# Patient Record
Sex: FEMALE | Race: BLACK OR AFRICAN AMERICAN | NOT HISPANIC OR LATINO
[De-identification: names, ages, dates, MRNs, and addresses within clinical notes are randomized per-mention and may not be internally consistent; named-entity substitution may affect disease eponyms.]

---

## 2022-05-13 ENCOUNTER — APPOINTMENT (OUTPATIENT)
Dept: ORTHOPEDIC SURGERY | Facility: CLINIC | Age: 67
End: 2022-05-13
Payer: MEDICARE

## 2022-05-13 VITALS — HEIGHT: 63 IN | BODY MASS INDEX: 28.35 KG/M2 | WEIGHT: 160 LBS

## 2022-05-13 DIAGNOSIS — I51.9 HEART DISEASE, UNSPECIFIED: ICD-10-CM

## 2022-05-13 DIAGNOSIS — Z78.9 OTHER SPECIFIED HEALTH STATUS: ICD-10-CM

## 2022-05-13 DIAGNOSIS — E78.00 PURE HYPERCHOLESTEROLEMIA, UNSPECIFIED: ICD-10-CM

## 2022-05-13 PROBLEM — Z00.00 ENCOUNTER FOR PREVENTIVE HEALTH EXAMINATION: Status: ACTIVE | Noted: 2022-05-13

## 2022-05-13 PROCEDURE — 99203 OFFICE O/P NEW LOW 30 MIN: CPT

## 2022-05-13 PROCEDURE — 99213 OFFICE O/P EST LOW 20 MIN: CPT

## 2022-05-15 PROBLEM — Z78.9 SOCIAL ALCOHOL USE: Status: ACTIVE | Noted: 2022-05-13

## 2022-05-15 PROBLEM — E78.00 HIGH CHOLESTEROL: Status: ACTIVE | Noted: 2022-05-13

## 2022-05-15 PROBLEM — Z78.9 NON-SMOKER: Status: ACTIVE | Noted: 2022-05-13

## 2022-05-15 PROBLEM — I51.9 HEART DISORDER: Status: ACTIVE | Noted: 2022-05-13

## 2022-05-15 NOTE — DISCUSSION/SUMMARY
[Medication Risks Reviewed] : Medication risks reviewed [de-identified] : Discussed proper body mechanics and modified physical activity to avoid aggravation of symptoms. Patient will continue with at home exercises/stretches as best tolerated. Reviewed and discussed results of lumbar spine MRI scan. Discussed conservative treatment options in the form of NSAIDs, physical therapy, acupuncture therapy, and injection therapy. Discussed limitations of benefits from surgical intervention. Recommended that patient try further conservative treatment options. Patient referred to Dr. Almonte for acupuncture therapy consultation. HEP given.

## 2022-05-15 NOTE — DATA REVIEWED
[MRI] : MRI [Lumbar Spine] : lumbar spine [Report was reviewed and noted in the chart] : The report was reviewed and noted in the chart [I independently reviewed and interpreted images and report] : I independently reviewed and interpreted images and report [I reviewed the films/CD and additionally noted] : I reviewed the films/CD and additionally noted [FreeTextEntry1] : I stop paperwork reviewed

## 2022-05-15 NOTE — PHYSICAL EXAM
[Normal Coordination] : normal coordination [Normal DTR UE/LE] : normal DTR UE/LE  [Normal Sensation] : normal sensation [Normal Mood and Affect] : normal mood and affect [Orientated] : orientated [Able to Communicate] : able to communicate [Normal Skin] : normal skin [No Rash] : no rash [No Ulcers] : no ulcers [No Lesions] : no lesions [No obvious lymphadenopathy in areas examined] : no obvious lymphadenopathy in areas examined [Well Developed] : well developed [Well Nourished] : well nourished [Peripheral vascular exam is grossly normal] : peripheral vascular exam is grossly normal [No Respiratory Distress] : no respiratory distress [Lungs clear to auscultation bilaterally] : lungs clear to auscultation bilaterally [NL (90)] : forward flexion 90 degrees [NL (30)] : right lateral rotation 30 degrees [NL (45)] : extension 45 degrees [NL (40)] : right lateral bending 40 degrees [Flexion] : flexion [Extension] : extension [5___] : right extensor hallicus longus 5[unfilled]/5 [] : non-antalgic

## 2022-05-15 NOTE — HISTORY OF PRESENT ILLNESS
[Sudden] : sudden [9] : 9 [Constant] : constant [Household chores] : household chores [Leisure] : leisure [Sleep] : sleep [Meds] : meds [Retired] : Work status: retired [de-identified] : Patient presents for initial consultation for lower back pain. Patient states she has history of chronic lower back pain. Treatment with formal physical therapy in the past did not provide relief. Treatment with meloxicam as prescribed has provided relief. Patient states she had to discontinue formal physical therapy due to having eye surgery done. [] : no [FreeTextEntry1] : L-spine  [de-identified] : Activity  [de-identified] : 12/2021

## 2022-06-13 ENCOUNTER — APPOINTMENT (OUTPATIENT)
Dept: PHYSICAL MEDICINE AND REHAB | Facility: CLINIC | Age: 67
End: 2022-06-13
Payer: MEDICARE

## 2022-06-13 VITALS
BODY MASS INDEX: 28.7 KG/M2 | HEIGHT: 63 IN | SYSTOLIC BLOOD PRESSURE: 148 MMHG | OXYGEN SATURATION: 99 % | DIASTOLIC BLOOD PRESSURE: 80 MMHG | WEIGHT: 162 LBS | HEART RATE: 75 BPM

## 2022-06-13 DIAGNOSIS — Z80.0 FAMILY HISTORY OF MALIGNANT NEOPLASM OF DIGESTIVE ORGANS: ICD-10-CM

## 2022-06-13 DIAGNOSIS — I25.10 ATHEROSCLEROTIC HEART DISEASE OF NATIVE CORONARY ARTERY W/OUT ANGINA PECTORIS: ICD-10-CM

## 2022-06-13 DIAGNOSIS — Z95.5 PRESENCE OF CORONARY ANGIOPLASTY IMPLANT AND GRAFT: ICD-10-CM

## 2022-06-13 DIAGNOSIS — Z82.49 FAMILY HISTORY OF ISCHEMIC HEART DISEASE AND OTHER DISEASES OF THE CIRCULATORY SYSTEM: ICD-10-CM

## 2022-06-13 DIAGNOSIS — Z83.3 FAMILY HISTORY OF DIABETES MELLITUS: ICD-10-CM

## 2022-06-13 PROCEDURE — 99205 OFFICE O/P NEW HI 60 MIN: CPT

## 2022-06-13 RX ORDER — MELOXICAM 10 MG/1
CAPSULE ORAL
Refills: 0 | Status: ACTIVE | COMMUNITY

## 2022-06-13 RX ORDER — OMEPRAZOLE 40 MG/1
CAPSULE, DELAYED RELEASE ORAL
Refills: 0 | Status: ACTIVE | COMMUNITY

## 2022-06-13 RX ORDER — EZETIMIBE 10 MG/1
10 TABLET ORAL
Refills: 0 | Status: ACTIVE | COMMUNITY

## 2022-06-13 RX ORDER — ATENOLOL 50 MG/1
TABLET ORAL
Refills: 0 | Status: ACTIVE | COMMUNITY

## 2022-06-13 RX ORDER — ATORVASTATIN CALCIUM 20 MG/1
20 TABLET, FILM COATED ORAL
Refills: 0 | Status: ACTIVE | COMMUNITY

## 2022-06-13 NOTE — CONSULT LETTER
[Dear  ___] : Dear  [unfilled], [Consult Letter:] : I had the pleasure of evaluating your patient, [unfilled]. [Please see my note below.] : Please see my note below. [Consult Closing:] : Thank you very much for allowing me to participate in the care of this patient.  If you have any questions, please do not hesitate to contact me. [Sincerely,] : Sincerely, [FreeTextEntry3] : Venkat Almonte MD\par

## 2022-06-13 NOTE — PHYSICAL EXAM
[Normal] : Normal skin color and pigmentation, normal turgor and no rash [FreeTextEntry1] : EXAMINATION OF LUMBAR SPINE & LOWER EXTREMITIES: \par \par \par Reflexes (R) L/E:\par                   Quadriceps 2\par                   Achilles 2\par Reflexes (L) L/E:\par                    Quadriceps 2\par                    Achilles 2\par \par Sensory L/E: intact \par \par \par Peripheral Pulses Palpable Bilateral L/E\par \par \par \par No gross atrophy involving muscualutre L/E. \par \par \par Normal gait. \par \par Testing: \par               Babinski [- bilaterally]\par               Chaddock [- bilaterally]\par               Oppenheim [- bilaterally]\par               Gonda [- bilaterally]\par               Clonus [- ankle bilaterally]\par               Leseague’s (R) [- ]\par               Leseague’s (L) [ -]\par            \par SI Jt Lig.Challenege Test (R) +\par SI Jt Lig.Challenege Test (L) +\par S.L.R. ( R ) -60 degrees with hamstring tightness\par S.L.R. ( L ) -60 degrees with hamstring tightness \par Range of Motion:\par                           Flexion:  55 º (normal - 75-90°)\par                           Extension:   20º (normal - 30°)\par                           Lateral Bending ( R ):   30º (normal - 35°)\par                           Lateral Bending ( L ):   30º (normal - 35°)\par                           Thoracic Rotation ( R ):     35º (normal - 35°)\par                           Thoracic Rotation ( L ):      35º (normal - 35)\par                           Internal Rotation Femur ( R ): WNL\par                           External Rotation Femur ( R ): WNL\par                           Internal Rotation Femur ( L ): WNL\par                           External Rotation Femur ( L ): WNL\par Able to ambulate on heels and toes. \par Vibratory: intact\par Proprioception: intact \par MMT: intact  \par Palpation of the Lumbar Spine: Tenderness involving the left SI joint. L4/L5 and L5/S1 interspace are tender. Tenderness and spasm involving left lower lumbar paraspinal musculature. Trigger points involving the left gluteal musculature. \par \par \par \par  [de-identified] : stent  [de-identified] : see exam  [de-identified] : see exam  [de-identified] : see exam

## 2022-06-13 NOTE — REVIEW OF SYSTEMS
[Negative] : Heme/Lymph [FreeTextEntry5] : stent  [FreeTextEntry9] : see exam  [de-identified] : see exam

## 2022-06-13 NOTE — HISTORY OF PRESENT ILLNESS
[FreeTextEntry1] : Pt is a 66 year old female with a history of chronic low back pain for at least the past 5 years. She reports acute exacerbation of low back pain last year when lifting something heavy. Pt followed up with Dr. Lyle and underwent a course of PT. Unfortunately, she reports no significant improvement with PT. Recently, has been evaluated by Dr. Sanchez (May 13th, 2022) due to persistence of low back pain. She was evaluated by Dr. Sanchez and was referred to my office for trial of acupuncture treatment. Pt reports low back pain (denies radicular) is aggravated with prolong sitting, standong and ambulation. As well as any increased level of physical activity. Reports difficulty sleeping due to low back pain. Denies bowel/bladder dysfunction. \par Pt presents to my office today for evaluation of acupuncture for treatment of her L/S complaints.

## 2022-06-13 NOTE — ASSESSMENT
[FreeTextEntry1] : Xray L/S\par Initiate 1x weekly/x 8 weeks L/S. Goal of which is to decrease pain, increase ROM, dissipate mucle spasm and increase level of function. \par Pt advised to follow up with PCP regarding elevated BP. \par HEP reviewed with pt in addition to posturing and body mechanics \par Recheck in 4 weeks.

## 2022-06-14 ENCOUNTER — RESULT REVIEW (OUTPATIENT)
Age: 67
End: 2022-06-14

## 2022-07-21 ENCOUNTER — APPOINTMENT (OUTPATIENT)
Dept: PHYSICAL MEDICINE AND REHAB | Facility: CLINIC | Age: 67
End: 2022-07-21

## 2022-07-21 PROCEDURE — 97814 ACUP 1/> W/ESTIM EA ADDL 15: CPT | Mod: 59

## 2022-07-21 PROCEDURE — 97813 ACUP 1/> W/ESTIM 1ST 15 MIN: CPT

## 2022-07-28 ENCOUNTER — APPOINTMENT (OUTPATIENT)
Dept: PHYSICAL MEDICINE AND REHAB | Facility: CLINIC | Age: 67
End: 2022-07-28

## 2022-08-04 ENCOUNTER — APPOINTMENT (OUTPATIENT)
Dept: PHYSICAL MEDICINE AND REHAB | Facility: CLINIC | Age: 67
End: 2022-08-04

## 2022-08-04 PROCEDURE — 97813 ACUP 1/> W/ESTIM 1ST 15 MIN: CPT

## 2022-08-04 PROCEDURE — 97814 ACUP 1/> W/ESTIM EA ADDL 15: CPT

## 2022-08-11 ENCOUNTER — APPOINTMENT (OUTPATIENT)
Dept: PHYSICAL MEDICINE AND REHAB | Facility: CLINIC | Age: 67
End: 2022-08-11

## 2022-08-11 PROCEDURE — 97813 ACUP 1/> W/ESTIM 1ST 15 MIN: CPT

## 2022-08-11 PROCEDURE — 97814 ACUP 1/> W/ESTIM EA ADDL 15: CPT

## 2022-08-18 ENCOUNTER — APPOINTMENT (OUTPATIENT)
Dept: PHYSICAL MEDICINE AND REHAB | Facility: CLINIC | Age: 67
End: 2022-08-18

## 2022-08-18 PROCEDURE — 97814 ACUP 1/> W/ESTIM EA ADDL 15: CPT

## 2022-08-18 PROCEDURE — 97813 ACUP 1/> W/ESTIM 1ST 15 MIN: CPT

## 2022-08-25 ENCOUNTER — APPOINTMENT (OUTPATIENT)
Dept: PHYSICAL MEDICINE AND REHAB | Facility: CLINIC | Age: 67
End: 2022-08-25

## 2022-08-25 PROCEDURE — 97814 ACUP 1/> W/ESTIM EA ADDL 15: CPT

## 2022-08-25 PROCEDURE — 97813 ACUP 1/> W/ESTIM 1ST 15 MIN: CPT

## 2022-08-30 ENCOUNTER — APPOINTMENT (OUTPATIENT)
Dept: PHYSICAL MEDICINE AND REHAB | Facility: CLINIC | Age: 67
End: 2022-08-30

## 2022-08-30 DIAGNOSIS — M62.838 OTHER MUSCLE SPASM: ICD-10-CM

## 2022-08-30 DIAGNOSIS — M24.9 JOINT DERANGEMENT, UNSPECIFIED: ICD-10-CM

## 2022-08-30 PROCEDURE — 97813 ACUP 1/> W/ESTIM 1ST 15 MIN: CPT

## 2022-08-30 PROCEDURE — 97814 ACUP 1/> W/ESTIM EA ADDL 15: CPT

## 2022-09-12 ENCOUNTER — APPOINTMENT (OUTPATIENT)
Dept: PHYSICAL MEDICINE AND REHAB | Facility: CLINIC | Age: 67
End: 2022-09-12

## 2022-09-12 DIAGNOSIS — M54.50 LOW BACK PAIN, UNSPECIFIED: ICD-10-CM

## 2022-09-12 DIAGNOSIS — M51.26 OTHER INTERVERTEBRAL DISC DISPLACEMENT, LUMBAR REGION: ICD-10-CM

## 2022-09-12 DIAGNOSIS — G89.29 LOW BACK PAIN, UNSPECIFIED: ICD-10-CM

## 2022-09-12 PROCEDURE — 99213 OFFICE O/P EST LOW 20 MIN: CPT

## 2022-09-12 NOTE — PHYSICAL EXAM
[FreeTextEntry1] : EXAMINATION OF LUMBAR SPINE & LOWER EXTREMITIES: \par \par \par \par Sensory L/E: intact \par Peripheral Pulses Palpable Bilateral L/E\par Range of Motion:\par                           Flexion:  65 º (normal - 75-90°)\par                           Extension:   30º (normal - 30°)\par                           Lateral Bending ( R ):   35º (normal - 35°)\par                           Lateral Bending ( L ):   35º (normal - 35°)\par                            Good thoracic rotation bilaterally \par  MMT: intact  \par Palpation of the Lumbar Spine: non tender lumbar spine. \par \par \par  [Normal] : Normal skin color and pigmentation, normal turgor and no rash [de-identified] : stent  [de-identified] : see exam  [de-identified] : see exam  [de-identified] : see exam

## 2022-09-12 NOTE — HISTORY OF PRESENT ILLNESS
[FreeTextEntry1] : Pt reports significant improvement with acupuncture.Pt states she has been able to increase her level of physical of activity.  Denies any specific low back complaints at the current time.. X-ray L.S reviewed with PT

## 2022-09-12 NOTE — REVIEW OF SYSTEMS
[Negative] : Heme/Lymph [FreeTextEntry5] : stent  [FreeTextEntry9] : see exam  [de-identified] : see exam

## 2022-11-17 ENCOUNTER — OFFICE (OUTPATIENT)
Dept: URBAN - METROPOLITAN AREA CLINIC 94 | Facility: CLINIC | Age: 67
Setting detail: OPHTHALMOLOGY
End: 2022-11-17
Payer: MEDICARE

## 2022-11-17 DIAGNOSIS — H43.813: ICD-10-CM

## 2022-11-17 DIAGNOSIS — H35.371: ICD-10-CM

## 2022-11-17 DIAGNOSIS — H33.312: ICD-10-CM

## 2022-11-17 DIAGNOSIS — H35.3132: ICD-10-CM

## 2022-11-17 PROCEDURE — 92235 FLUORESCEIN ANGRPH MLTIFRAME: CPT | Performed by: SPECIALIST

## 2022-11-17 PROCEDURE — 92134 CPTRZ OPH DX IMG PST SGM RTA: CPT | Performed by: SPECIALIST

## 2022-11-17 PROCEDURE — 92014 COMPRE OPH EXAM EST PT 1/>: CPT | Performed by: SPECIALIST

## 2022-11-17 ASSESSMENT — REFRACTION_AUTOREFRACTION
OS_SPHERE: -1.25
OS_CYLINDER: -0.50
OS_AXIS: 120
OD_CYLINDER: -1.00
OD_AXIS: 035
OD_SPHERE: -0.75

## 2022-11-17 ASSESSMENT — AXIALLENGTH_DERIVED
OD_AL: 22.7766
OS_AL: 22.8677

## 2022-11-17 ASSESSMENT — KERATOMETRY
OD_AXISANGLE_DEGREES: 124
OD_K2POWER_DIOPTERS: 47.50
OS_AXISANGLE_DEGREES: 077
METHOD_AUTO_MANUAL: AUTO
OS_K2POWER_DIOPTERS: 47.25
OS_K1POWER_DIOPTERS: 47.00
OD_K1POWER_DIOPTERS: 46.75

## 2022-11-17 ASSESSMENT — SPHEQUIV_DERIVED
OS_SPHEQUIV: -1.5
OD_SPHEQUIV: -1.25

## 2022-11-17 ASSESSMENT — VISUAL ACUITY
OD_BCVA: 20/30
OS_BCVA: 20/40+1

## 2022-11-17 ASSESSMENT — TONOMETRY
OD_IOP_MMHG: 17
OS_IOP_MMHG: 16

## 2023-03-21 ENCOUNTER — OFFICE (OUTPATIENT)
Dept: URBAN - METROPOLITAN AREA CLINIC 113 | Facility: CLINIC | Age: 68
Setting detail: OPHTHALMOLOGY
End: 2023-03-21
Payer: MEDICARE

## 2023-03-21 ENCOUNTER — RX ONLY (RX ONLY)
Age: 68
End: 2023-03-21

## 2023-03-21 DIAGNOSIS — H52.223: ICD-10-CM

## 2023-03-21 DIAGNOSIS — H52.4: ICD-10-CM

## 2023-03-21 PROCEDURE — 92015 DETERMINE REFRACTIVE STATE: CPT | Performed by: OPTOMETRIST

## 2023-03-21 ASSESSMENT — CONFRONTATIONAL VISUAL FIELD TEST (CVF)
OD_FINDINGS: FULL
OS_FINDINGS: FULL

## 2023-03-21 ASSESSMENT — TONOMETRY
OD_IOP_MMHG: 15
OS_IOP_MMHG: 17

## 2023-03-27 ASSESSMENT — REFRACTION_AUTOREFRACTION
OD_CYLINDER: -1.75
OD_AXIS: 084
OD_SPHERE: +0.25
OS_CYLINDER: -0.50
OS_SPHERE: -1.00
OS_AXIS: 057

## 2023-03-27 ASSESSMENT — REFRACTION_MANIFEST
OS_ADD: +1.75
OD_VA1: 20/25
OS_CYLINDER: -1.25
OD_SPHERE: PLANO
OD_ADD: +1.75
OU_VA: 20/20
OD_AXIS: 085
OD_CYLINDER: -1.50
OS_VA1: 20/25
OS_SPHERE: PLANO
OS_AXIS: 075

## 2023-03-27 ASSESSMENT — KERATOMETRY
OD_AXISANGLE_DEGREES: 172
OS_AXISANGLE_DEGREES: 012
OD_K1POWER_DIOPTERS: 46.00
METHOD_AUTO_MANUAL: AUTO
OS_K1POWER_DIOPTERS: 46.75
OS_K2POWER_DIOPTERS: 47.25
OD_K2POWER_DIOPTERS: 47.00

## 2023-03-27 ASSESSMENT — SPHEQUIV_DERIVED
OD_SPHEQUIV: -0.625
OS_SPHEQUIV: -1.25

## 2023-03-27 ASSESSMENT — AXIALLENGTH_DERIVED
OD_AL: 22.7636
OS_AL: 22.8195

## 2023-03-27 ASSESSMENT — VISUAL ACUITY
OD_BCVA: 20/40
OS_BCVA: 20/30+

## 2023-03-31 ENCOUNTER — OFFICE (OUTPATIENT)
Dept: URBAN - METROPOLITAN AREA CLINIC 94 | Facility: CLINIC | Age: 68
Setting detail: OPHTHALMOLOGY
End: 2023-03-31
Payer: MEDICARE

## 2023-03-31 DIAGNOSIS — H16.223: ICD-10-CM

## 2023-03-31 DIAGNOSIS — H35.3132: ICD-10-CM

## 2023-03-31 DIAGNOSIS — H35.371: ICD-10-CM

## 2023-03-31 DIAGNOSIS — Z96.1: ICD-10-CM

## 2023-03-31 PROBLEM — H52.223 ASTIGMATISM; BOTH EYES: Status: ACTIVE | Noted: 2023-03-21

## 2023-03-31 PROCEDURE — 92250 FUNDUS PHOTOGRAPHY W/I&R: CPT | Performed by: OPHTHALMOLOGY

## 2023-03-31 PROCEDURE — 99213 OFFICE O/P EST LOW 20 MIN: CPT | Performed by: OPHTHALMOLOGY

## 2023-03-31 ASSESSMENT — REFRACTION_MANIFEST
OD_ADD: +1.75
OD_VA1: 20/25
OU_VA: 20/20
OS_ADD: +1.75
OS_SPHERE: PLANO
OS_VA1: 20/25
OD_SPHERE: PLANO
OS_AXIS: 075
OD_AXIS: 085
OS_CYLINDER: -1.25
OD_CYLINDER: -1.50

## 2023-03-31 ASSESSMENT — REFRACTION_AUTOREFRACTION
OD_CYLINDER: -1.25
OD_AXIS: 083
OD_SPHERE: 0.00
OS_CYLINDER: -0.25
OS_AXIS: 079
OS_SPHERE: -1.00

## 2023-03-31 ASSESSMENT — SPHEQUIV_DERIVED
OS_SPHEQUIV: -1.125
OD_SPHEQUIV: -0.625

## 2023-03-31 ASSESSMENT — VISUAL ACUITY
OS_BCVA: 20/40+1
OD_BCVA: 20/70+1

## 2023-03-31 ASSESSMENT — TONOMETRY
OS_IOP_MMHG: 16
OD_IOP_MMHG: 16

## 2023-03-31 ASSESSMENT — CONFRONTATIONAL VISUAL FIELD TEST (CVF)
OD_FINDINGS: FULL
OS_FINDINGS: FULL

## 2023-04-13 ENCOUNTER — OFFICE (OUTPATIENT)
Dept: URBAN - METROPOLITAN AREA CLINIC 94 | Facility: CLINIC | Age: 68
Setting detail: OPHTHALMOLOGY
End: 2023-04-13
Payer: MEDICARE

## 2023-04-13 ENCOUNTER — RX ONLY (RX ONLY)
Age: 68
End: 2023-04-13

## 2023-04-13 DIAGNOSIS — H35.3132: ICD-10-CM

## 2023-04-13 DIAGNOSIS — H43.813: ICD-10-CM

## 2023-04-13 DIAGNOSIS — H33.312: ICD-10-CM

## 2023-04-13 DIAGNOSIS — H35.371: ICD-10-CM

## 2023-04-13 PROCEDURE — 92235 FLUORESCEIN ANGRPH MLTIFRAME: CPT | Performed by: SPECIALIST

## 2023-04-13 PROCEDURE — 92014 COMPRE OPH EXAM EST PT 1/>: CPT | Performed by: SPECIALIST

## 2023-04-13 PROCEDURE — 92134 CPTRZ OPH DX IMG PST SGM RTA: CPT | Performed by: SPECIALIST

## 2023-04-13 ASSESSMENT — AXIALLENGTH_DERIVED
OD_AL: 22.721
OS_AL: 22.774

## 2023-04-13 ASSESSMENT — KERATOMETRY
OD_K1POWER_DIOPTERS: 46.25
OS_K2POWER_DIOPTERS: 47.25
METHOD_AUTO_MANUAL: AUTO
OS_AXISANGLE_DEGREES: 083
OS_K1POWER_DIOPTERS: 46.75
OD_K2POWER_DIOPTERS: 47.00
OD_AXISANGLE_DEGREES: 158

## 2023-04-13 ASSESSMENT — CONFRONTATIONAL VISUAL FIELD TEST (CVF)
OS_FINDINGS: FULL
OD_FINDINGS: FULL

## 2023-04-13 ASSESSMENT — REFRACTION_AUTOREFRACTION
OS_AXIS: 079
OS_SPHERE: -1.00
OS_CYLINDER: -0.25
OD_CYLINDER: -1.25
OD_AXIS: 083
OD_SPHERE: 0.00

## 2023-04-13 ASSESSMENT — VISUAL ACUITY
OD_BCVA: 20/40-1
OS_BCVA: 20/40+1

## 2023-04-13 ASSESSMENT — TONOMETRY
OS_IOP_MMHG: 16
OD_IOP_MMHG: 16

## 2023-04-13 ASSESSMENT — SPHEQUIV_DERIVED
OD_SPHEQUIV: -0.625
OS_SPHEQUIV: -1.125

## 2023-06-30 ENCOUNTER — OFFICE (OUTPATIENT)
Dept: URBAN - METROPOLITAN AREA CLINIC 94 | Facility: CLINIC | Age: 68
Setting detail: OPHTHALMOLOGY
End: 2023-06-30
Payer: MEDICARE

## 2023-06-30 DIAGNOSIS — H35.3132: ICD-10-CM

## 2023-06-30 DIAGNOSIS — H16.223: ICD-10-CM

## 2023-06-30 DIAGNOSIS — H35.371: ICD-10-CM

## 2023-06-30 DIAGNOSIS — Z96.1: ICD-10-CM

## 2023-06-30 PROCEDURE — 92012 INTRM OPH EXAM EST PATIENT: CPT | Performed by: OPHTHALMOLOGY

## 2023-06-30 ASSESSMENT — SPHEQUIV_DERIVED
OD_SPHEQUIV: -0.625
OS_SPHEQUIV: -1

## 2023-06-30 ASSESSMENT — REFRACTION_AUTOREFRACTION
OS_CYLINDER: -0.50
OS_SPHERE: -0.75
OD_SPHERE: 0.00
OD_AXIS: 081
OS_AXIS: 083
OD_CYLINDER: -1.25

## 2023-06-30 ASSESSMENT — VISUAL ACUITY
OD_BCVA: 20/40
OS_BCVA: 20/40

## 2023-06-30 ASSESSMENT — TONOMETRY
OS_IOP_MMHG: 16
OD_IOP_MMHG: 16

## 2023-06-30 ASSESSMENT — CONFRONTATIONAL VISUAL FIELD TEST (CVF)
OD_FINDINGS: FULL
OS_FINDINGS: FULL

## 2023-07-24 ENCOUNTER — APPOINTMENT (OUTPATIENT)
Dept: GASTROENTEROLOGY | Facility: CLINIC | Age: 68
End: 2023-07-24
Payer: MEDICARE

## 2023-07-24 VITALS
BODY MASS INDEX: 29.95 KG/M2 | HEIGHT: 63 IN | HEART RATE: 64 BPM | OXYGEN SATURATION: 99 % | DIASTOLIC BLOOD PRESSURE: 81 MMHG | TEMPERATURE: 97.1 F | SYSTOLIC BLOOD PRESSURE: 130 MMHG | WEIGHT: 169 LBS

## 2023-07-24 PROCEDURE — 99214 OFFICE O/P EST MOD 30 MIN: CPT

## 2023-07-24 PROCEDURE — 99204 OFFICE O/P NEW MOD 45 MIN: CPT

## 2023-08-03 ENCOUNTER — TRANSCRIPTION ENCOUNTER (OUTPATIENT)
Age: 68
End: 2023-08-03

## 2023-10-19 ENCOUNTER — OFFICE (OUTPATIENT)
Dept: URBAN - METROPOLITAN AREA CLINIC 94 | Facility: CLINIC | Age: 68
Setting detail: OPHTHALMOLOGY
End: 2023-10-19
Payer: MEDICARE

## 2023-10-19 DIAGNOSIS — H43.813: ICD-10-CM

## 2023-10-19 DIAGNOSIS — H35.3132: ICD-10-CM

## 2023-10-19 DIAGNOSIS — H35.371: ICD-10-CM

## 2023-10-19 DIAGNOSIS — H33.312: ICD-10-CM

## 2023-10-19 PROCEDURE — 92134 CPTRZ OPH DX IMG PST SGM RTA: CPT | Performed by: SPECIALIST

## 2023-10-19 PROCEDURE — 92012 INTRM OPH EXAM EST PATIENT: CPT | Performed by: SPECIALIST

## 2023-10-19 ASSESSMENT — REFRACTION_AUTOREFRACTION
OS_AXIS: 083
OD_AXIS: 081
OD_SPHERE: 0.00
OS_CYLINDER: -0.50
OS_SPHERE: -0.75
OD_CYLINDER: -1.25

## 2023-10-19 ASSESSMENT — KERATOMETRY
OD_K2POWER_DIOPTERS: 47.00
OS_AXISANGLE_DEGREES: 090
OS_K2POWER_DIOPTERS: 47.00
METHOD_AUTO_MANUAL: AUTO
OD_K1POWER_DIOPTERS: 46.20
OD_AXISANGLE_DEGREES: 165
OS_K1POWER_DIOPTERS: 47.00

## 2023-10-19 ASSESSMENT — AXIALLENGTH_DERIVED
OS_AL: 22.7287
OD_AL: 22.73

## 2023-10-19 ASSESSMENT — TONOMETRY
OS_IOP_MMHG: 14
OD_IOP_MMHG: 15

## 2023-10-19 ASSESSMENT — SPHEQUIV_DERIVED
OS_SPHEQUIV: -1
OD_SPHEQUIV: -0.625

## 2023-10-19 ASSESSMENT — CONFRONTATIONAL VISUAL FIELD TEST (CVF)
OD_FINDINGS: FULL
OS_FINDINGS: FULL

## 2023-10-19 ASSESSMENT — VISUAL ACUITY
OS_BCVA: 20/30
OD_BCVA: 20/30-1

## 2023-12-08 ENCOUNTER — OFFICE (OUTPATIENT)
Dept: URBAN - METROPOLITAN AREA CLINIC 94 | Facility: CLINIC | Age: 68
Setting detail: OPHTHALMOLOGY
End: 2023-12-08
Payer: MEDICARE

## 2023-12-08 DIAGNOSIS — H16.223: ICD-10-CM

## 2023-12-08 DIAGNOSIS — H16.221: ICD-10-CM

## 2023-12-08 DIAGNOSIS — H35.371: ICD-10-CM

## 2023-12-08 DIAGNOSIS — H16.222: ICD-10-CM

## 2023-12-08 PROCEDURE — 83861 MICROFLUID ANALY TEARS: CPT | Mod: QW,LT | Performed by: OPHTHALMOLOGY

## 2023-12-08 PROCEDURE — 92014 COMPRE OPH EXAM EST PT 1/>: CPT | Performed by: OPHTHALMOLOGY

## 2023-12-08 PROCEDURE — 83861 MICROFLUID ANALY TEARS: CPT | Mod: QW,RT | Performed by: OPHTHALMOLOGY

## 2023-12-08 ASSESSMENT — REFRACTION_MANIFEST
OS_CYLINDER: 0.00
OD_AXIS: 083
OS_SPHERE: -1.00
OS_AXIS: 000
OS_VA1: 20/30+1
OU_VA: 20/30+1
OD_SPHERE: PLANO
OD_CYLINDER: -1.50
OD_VA1: 20/30-1

## 2023-12-08 ASSESSMENT — SPHEQUIV_DERIVED
OD_SPHEQUIV: -0.5
OS_SPHEQUIV: -1
OS_SPHEQUIV: -1.25

## 2023-12-08 ASSESSMENT — REFRACTION_AUTOREFRACTION
OD_SPHERE: +0.25
OS_AXIS: 000
OS_CYLINDER: 0.00
OD_AXIS: 083
OS_SPHERE: -1.25
OD_CYLINDER: -1.50

## 2023-12-08 ASSESSMENT — SUPERFICIAL PUNCTATE KERATITIS (SPK)
OS_SPK: T
OD_SPK: T

## 2023-12-08 ASSESSMENT — CONFRONTATIONAL VISUAL FIELD TEST (CVF)
OS_FINDINGS: FULL
OD_FINDINGS: FULL

## 2024-02-12 ENCOUNTER — OFFICE (OUTPATIENT)
Dept: URBAN - METROPOLITAN AREA CLINIC 113 | Facility: CLINIC | Age: 69
Setting detail: OPHTHALMOLOGY
End: 2024-02-12
Payer: MEDICARE

## 2024-02-12 DIAGNOSIS — H16.222: ICD-10-CM

## 2024-02-12 DIAGNOSIS — H11.153: ICD-10-CM

## 2024-02-12 DIAGNOSIS — H16.221: ICD-10-CM

## 2024-02-12 DIAGNOSIS — H16.223: ICD-10-CM

## 2024-02-12 DIAGNOSIS — H35.371: ICD-10-CM

## 2024-02-12 DIAGNOSIS — H52.7: ICD-10-CM

## 2024-02-12 PROCEDURE — 99213 OFFICE O/P EST LOW 20 MIN: CPT | Performed by: OPTOMETRIST

## 2024-02-12 PROCEDURE — 92015 DETERMINE REFRACTIVE STATE: CPT | Performed by: OPTOMETRIST

## 2024-02-12 ASSESSMENT — SUPERFICIAL PUNCTATE KERATITIS (SPK)
OS_SPK: T
OD_SPK: T

## 2024-02-12 ASSESSMENT — REFRACTION_AUTOREFRACTION
OS_AXIS: 102
OD_SPHERE: +0.25
OD_AXIS: 085
OD_CYLINDER: -1.50
OS_SPHERE: -1.00
OS_CYLINDER: -0.25

## 2024-02-12 ASSESSMENT — REFRACTION_MANIFEST
OS_SPHERE: -1.00
OD_ADD: +2.50
OS_VA1: 20/30+
OU_VA: 20/30+1
OD_VA1: 20/30-1
OD_SPHERE: PLANO
OD_CYLINDER: -1.00
OS_ADD: +2.50
OS_AXIS: 000
OD_VA1: 20/30
OS_CYLINDER: SPH
OD_AXIS: 083
OD_CYLINDER: -1.50
OS_SPHERE: -1.00
OS_VA1: 20/30+1
OD_AXIS: 080
OU_VA: 20/30+
OD_SPHERE: PLANO
OS_CYLINDER: 0.00

## 2024-02-12 ASSESSMENT — SPHEQUIV_DERIVED
OD_SPHEQUIV: -0.5
OS_SPHEQUIV: -1.125
OS_SPHEQUIV: -1

## 2024-02-12 ASSESSMENT — CONFRONTATIONAL VISUAL FIELD TEST (CVF)
OD_FINDINGS: FULL
OS_FINDINGS: FULL

## 2024-03-13 ENCOUNTER — OFFICE (OUTPATIENT)
Dept: URBAN - METROPOLITAN AREA CLINIC 94 | Facility: CLINIC | Age: 69
Setting detail: OPHTHALMOLOGY
End: 2024-03-13
Payer: MEDICARE

## 2024-03-13 ENCOUNTER — RX ONLY (RX ONLY)
Age: 69
End: 2024-03-13

## 2024-03-13 DIAGNOSIS — H35.371: ICD-10-CM

## 2024-03-13 DIAGNOSIS — H35.3132: ICD-10-CM

## 2024-03-13 DIAGNOSIS — H16.222: ICD-10-CM

## 2024-03-13 DIAGNOSIS — H16.223: ICD-10-CM

## 2024-03-13 DIAGNOSIS — H16.221: ICD-10-CM

## 2024-03-13 DIAGNOSIS — H11.153: ICD-10-CM

## 2024-03-13 PROCEDURE — 99213 OFFICE O/P EST LOW 20 MIN: CPT | Performed by: OPHTHALMOLOGY

## 2024-03-13 PROCEDURE — 83861 MICROFLUID ANALY TEARS: CPT | Mod: QW,LT | Performed by: OPHTHALMOLOGY

## 2024-03-13 PROCEDURE — 92250 FUNDUS PHOTOGRAPHY W/I&R: CPT | Performed by: OPHTHALMOLOGY

## 2024-03-13 PROCEDURE — 83861 MICROFLUID ANALY TEARS: CPT | Mod: QW,RT | Performed by: OPHTHALMOLOGY

## 2024-03-13 ASSESSMENT — REFRACTION_MANIFEST
OS_AXIS: 000
OS_VA1: 20/30+
OD_VA1: 20/30
OS_SPHERE: -1.00
OD_CYLINDER: -1.50
OU_VA: 20/30+1
OS_ADD: +2.50
OD_AXIS: 080
OD_VA1: 20/30-1
OD_ADD: +2.50
OS_SPHERE: -1.00
OD_CYLINDER: -1.00
OD_SPHERE: PLANO
OD_AXIS: 083
OS_CYLINDER: 0.00
OS_CYLINDER: SPH
OD_SPHERE: PLANO
OU_VA: 20/30+
OS_VA1: 20/30+1

## 2024-03-13 ASSESSMENT — SPHEQUIV_DERIVED: OS_SPHEQUIV: -1

## 2024-04-18 ENCOUNTER — OFFICE (OUTPATIENT)
Dept: URBAN - METROPOLITAN AREA CLINIC 94 | Facility: CLINIC | Age: 69
Setting detail: OPHTHALMOLOGY
End: 2024-04-18
Payer: MEDICARE

## 2024-04-18 DIAGNOSIS — H33.312: ICD-10-CM

## 2024-04-18 DIAGNOSIS — H43.813: ICD-10-CM

## 2024-04-18 DIAGNOSIS — H35.3132: ICD-10-CM

## 2024-04-18 DIAGNOSIS — H35.371: ICD-10-CM

## 2024-04-18 PROCEDURE — 92012 INTRM OPH EXAM EST PATIENT: CPT | Performed by: SPECIALIST

## 2024-04-18 PROCEDURE — 92134 CPTRZ OPH DX IMG PST SGM RTA: CPT | Performed by: SPECIALIST

## 2024-04-18 PROCEDURE — 92235 FLUORESCEIN ANGRPH MLTIFRAME: CPT | Performed by: SPECIALIST

## 2024-10-09 ENCOUNTER — OFFICE (OUTPATIENT)
Dept: URBAN - METROPOLITAN AREA CLINIC 94 | Facility: CLINIC | Age: 69
Setting detail: OPHTHALMOLOGY
End: 2024-10-09
Payer: MEDICARE

## 2024-10-09 DIAGNOSIS — H35.3132: ICD-10-CM

## 2024-10-09 DIAGNOSIS — H11.153: ICD-10-CM

## 2024-10-09 PROCEDURE — 99213 OFFICE O/P EST LOW 20 MIN: CPT | Performed by: OPHTHALMOLOGY

## 2024-10-09 ASSESSMENT — REFRACTION_CURRENTRX
OS_VPRISM_DIRECTION: PROGS
OS_OVR_VA: 20/
OS_AXIS: 110
OD_ADD: +2.50
OD_SPHERE: PLANO
OD_VPRISM_DIRECTION: PROGS
OD_OVR_VA: 20/
OS_CYLINDER: -0.25
OD_CYLINDER: -0.75
OS_SPHERE: -1.00
OS_ADD: +2.50
OD_AXIS: 079

## 2024-10-09 ASSESSMENT — REFRACTION_AUTOREFRACTION
OD_SPHERE: -0.25
OD_AXIS: 089
OD_CYLINDER: -0.75
OS_SPHERE: -1.25
OS_CYLINDER: -0.25
OS_AXIS: 134

## 2024-10-09 ASSESSMENT — KERATOMETRY
OS_K2POWER_DIOPTERS: 47.75
METHOD_AUTO_MANUAL: AUTO
OD_K2POWER_DIOPTERS: 46.75
OD_AXISANGLE_DEGREES: 175
OD_K1POWER_DIOPTERS: 45.75
OS_AXISANGLE_DEGREES: 074
OS_K1POWER_DIOPTERS: 47.00

## 2024-10-09 ASSESSMENT — TONOMETRY
OD_IOP_MMHG: 18
OS_IOP_MMHG: 21

## 2024-10-09 ASSESSMENT — CONFRONTATIONAL VISUAL FIELD TEST (CVF)
OS_FINDINGS: FULL
OD_FINDINGS: FULL

## 2024-10-09 ASSESSMENT — SUPERFICIAL PUNCTATE KERATITIS (SPK)
OS_SPK: T
OD_SPK: T

## 2024-10-09 ASSESSMENT — VISUAL ACUITY
OS_BCVA: 20/30-1
OD_BCVA: 20/30+1

## 2024-10-31 ENCOUNTER — OFFICE (OUTPATIENT)
Dept: URBAN - METROPOLITAN AREA CLINIC 94 | Facility: CLINIC | Age: 69
Setting detail: OPHTHALMOLOGY
End: 2024-10-31
Payer: MEDICARE

## 2024-10-31 DIAGNOSIS — H43.813: ICD-10-CM

## 2024-10-31 DIAGNOSIS — H35.3132: ICD-10-CM

## 2024-10-31 DIAGNOSIS — H33.312: ICD-10-CM

## 2024-10-31 DIAGNOSIS — H35.371: ICD-10-CM

## 2024-10-31 PROCEDURE — 92235 FLUORESCEIN ANGRPH MLTIFRAME: CPT | Performed by: SPECIALIST

## 2024-10-31 PROCEDURE — 92134 CPTRZ OPH DX IMG PST SGM RTA: CPT | Performed by: SPECIALIST

## 2024-10-31 PROCEDURE — 92014 COMPRE OPH EXAM EST PT 1/>: CPT | Performed by: SPECIALIST

## 2024-10-31 ASSESSMENT — TONOMETRY
OD_IOP_MMHG: 20
OS_IOP_MMHG: 21

## 2024-10-31 ASSESSMENT — VISUAL ACUITY
OD_BCVA: 20/30
OS_BCVA: 20/25-1

## 2024-10-31 ASSESSMENT — CONFRONTATIONAL VISUAL FIELD TEST (CVF)
OD_FINDINGS: FULL
OS_FINDINGS: FULL

## 2025-06-11 ENCOUNTER — OFFICE (OUTPATIENT)
Dept: URBAN - METROPOLITAN AREA CLINIC 94 | Facility: CLINIC | Age: 70
Setting detail: OPHTHALMOLOGY
End: 2025-06-11
Payer: MEDICARE

## 2025-06-11 DIAGNOSIS — H33.312: ICD-10-CM

## 2025-06-11 DIAGNOSIS — H35.3132: ICD-10-CM

## 2025-06-11 DIAGNOSIS — Z96.1: ICD-10-CM

## 2025-06-11 DIAGNOSIS — H35.371: ICD-10-CM

## 2025-06-11 PROCEDURE — 92250 FUNDUS PHOTOGRAPHY W/I&R: CPT | Performed by: OPHTHALMOLOGY

## 2025-06-11 PROCEDURE — 92014 COMPRE OPH EXAM EST PT 1/>: CPT | Performed by: OPHTHALMOLOGY

## 2025-06-11 ASSESSMENT — TONOMETRY
OS_IOP_MMHG: 21
OD_IOP_MMHG: 17

## 2025-06-11 ASSESSMENT — REFRACTION_AUTOREFRACTION
OS_SPHERE: -1.00
OD_CYLINDER: -1.00
OD_SPHERE: +0.25
OD_AXIS: 083
OS_AXIS: 098
OS_CYLINDER: -0.25

## 2025-06-11 ASSESSMENT — KERATOMETRY
OS_K2POWER_DIOPTERS: 47.25
OD_K1POWER_DIOPTERS: 46.00
OD_AXISANGLE_DEGREES: 140
OD_K2POWER_DIOPTERS: 46.75
METHOD_AUTO_MANUAL: AUTO
OS_K1POWER_DIOPTERS: 46.75
OS_AXISANGLE_DEGREES: 009

## 2025-06-11 ASSESSMENT — CONFRONTATIONAL VISUAL FIELD TEST (CVF)
OS_FINDINGS: FULL
OD_FINDINGS: FULL

## 2025-06-11 ASSESSMENT — REFRACTION_CURRENTRX
OS_CYLINDER: -0.25
OS_ADD: +2.50
OD_AXIS: 087
OD_OVR_VA: 20/
OS_SPHERE: -0.75
OD_CYLINDER: -1.00
OS_OVR_VA: 20/
OD_ADD: +2.50
OD_SPHERE: PLANO
OD_VPRISM_DIRECTION: PROGS
OS_AXIS: 109
OS_VPRISM_DIRECTION: PROGS

## 2025-06-11 ASSESSMENT — SUPERFICIAL PUNCTATE KERATITIS (SPK)
OD_SPK: T
OS_SPK: T

## 2025-06-11 ASSESSMENT — VISUAL ACUITY
OD_BCVA: 20/40-
OS_BCVA: 20/50-

## 2025-07-07 ENCOUNTER — OFFICE (OUTPATIENT)
Dept: URBAN - METROPOLITAN AREA CLINIC 94 | Facility: CLINIC | Age: 70
Setting detail: OPHTHALMOLOGY
End: 2025-07-07
Payer: MEDICARE

## 2025-07-07 DIAGNOSIS — H35.371: ICD-10-CM

## 2025-07-07 DIAGNOSIS — H33.312: ICD-10-CM

## 2025-07-07 DIAGNOSIS — H43.813: ICD-10-CM

## 2025-07-07 DIAGNOSIS — H35.3132: ICD-10-CM

## 2025-07-07 PROCEDURE — 92134 CPTRZ OPH DX IMG PST SGM RTA: CPT | Performed by: SPECIALIST

## 2025-07-07 PROCEDURE — 92235 FLUORESCEIN ANGRPH MLTIFRAME: CPT | Performed by: SPECIALIST

## 2025-07-07 PROCEDURE — 92014 COMPRE OPH EXAM EST PT 1/>: CPT | Performed by: SPECIALIST

## 2025-07-07 ASSESSMENT — CONFRONTATIONAL VISUAL FIELD TEST (CVF)
OD_FINDINGS: FULL
OS_FINDINGS: FULL

## 2025-07-07 ASSESSMENT — KERATOMETRY
OS_K2POWER_DIOPTERS: 47.25
OD_AXISANGLE_DEGREES: 140
OS_AXISANGLE_DEGREES: 009
OD_K2POWER_DIOPTERS: 46.75
METHOD_AUTO_MANUAL: AUTO
OD_K1POWER_DIOPTERS: 46.00
OS_K1POWER_DIOPTERS: 46.75

## 2025-07-07 ASSESSMENT — SUPERFICIAL PUNCTATE KERATITIS (SPK)
OD_SPK: T
OS_SPK: T

## 2025-07-07 ASSESSMENT — REFRACTION_AUTOREFRACTION
OS_CYLINDER: -0.25
OS_AXIS: 098
OD_AXIS: 083
OD_CYLINDER: -1.00
OS_SPHERE: -1.00
OD_SPHERE: +0.25

## 2025-07-07 ASSESSMENT — TONOMETRY
OS_IOP_MMHG: 19
OD_IOP_MMHG: 15

## 2025-07-07 ASSESSMENT — VISUAL ACUITY
OD_BCVA: 20/20-1
OS_BCVA: 20/30

## 2025-07-18 ENCOUNTER — APPOINTMENT (OUTPATIENT)
Dept: COLORECTAL SURGERY | Facility: CLINIC | Age: 70
End: 2025-07-18
Payer: MEDICARE

## 2025-07-18 ENCOUNTER — NON-APPOINTMENT (OUTPATIENT)
Age: 70
End: 2025-07-18

## 2025-07-18 VITALS
HEART RATE: 65 BPM | TEMPERATURE: 97.5 F | DIASTOLIC BLOOD PRESSURE: 84 MMHG | RESPIRATION RATE: 16 BRPM | SYSTOLIC BLOOD PRESSURE: 141 MMHG | WEIGHT: 170 LBS | OXYGEN SATURATION: 96 % | BODY MASS INDEX: 30.12 KG/M2 | HEIGHT: 63 IN

## 2025-07-18 PROCEDURE — 46600 DIAGNOSTIC ANOSCOPY SPX: CPT

## 2025-07-18 PROCEDURE — 99204 OFFICE O/P NEW MOD 45 MIN: CPT | Mod: 25
